# Patient Record
Sex: FEMALE | Race: WHITE | Employment: UNEMPLOYED | URBAN - METROPOLITAN AREA
[De-identification: names, ages, dates, MRNs, and addresses within clinical notes are randomized per-mention and may not be internally consistent; named-entity substitution may affect disease eponyms.]

---

## 2022-08-31 ENCOUNTER — APPOINTMENT (OUTPATIENT)
Dept: GENERAL RADIOLOGY | Age: 57
End: 2022-08-31

## 2022-08-31 ENCOUNTER — HOSPITAL ENCOUNTER (EMERGENCY)
Age: 57
Discharge: HOME | End: 2022-08-31
Attending: EMERGENCY MEDICINE

## 2022-08-31 VITALS
DIASTOLIC BLOOD PRESSURE: 73 MMHG | HEART RATE: 84 BPM | SYSTOLIC BLOOD PRESSURE: 128 MMHG | TEMPERATURE: 99 F | RESPIRATION RATE: 18 BRPM | OXYGEN SATURATION: 97 %

## 2022-08-31 DIAGNOSIS — S93.491A SPRAIN OF ANTERIOR TALOFIBULAR LIGAMENT OF RIGHT ANKLE, INITIAL ENCOUNTER: Primary | ICD-10-CM

## 2022-08-31 PROCEDURE — 99283 EMERGENCY DEPT VISIT LOW MDM: CPT

## 2022-08-31 PROCEDURE — 73630 X-RAY EXAM OF FOOT: CPT

## 2022-08-31 PROCEDURE — 73610 X-RAY EXAM OF ANKLE: CPT

## 2022-08-31 ASSESSMENT — ENCOUNTER SYMPTOMS
VOMITING: 0
NAUSEA: 0
ABDOMINAL PAIN: 0
BACK PAIN: 0
EYE PAIN: 0
SHORTNESS OF BREATH: 0

## 2022-08-31 ASSESSMENT — PAIN DESCRIPTION - LOCATION
LOCATION_2: FOOT
LOCATION: ANKLE;FOOT

## 2022-08-31 ASSESSMENT — PAIN DESCRIPTION - INTENSITY: RATING_2: 5

## 2022-08-31 ASSESSMENT — PAIN DESCRIPTION - PAIN TYPE: TYPE: ACUTE PAIN

## 2022-08-31 ASSESSMENT — PAIN DESCRIPTION - DESCRIPTORS
DESCRIPTORS_2: ACHING
DESCRIPTORS: ACHING

## 2022-08-31 ASSESSMENT — PAIN - FUNCTIONAL ASSESSMENT: PAIN_FUNCTIONAL_ASSESSMENT: 0-10

## 2022-08-31 ASSESSMENT — PAIN SCALES - GENERAL: PAINLEVEL_OUTOF10: 5

## 2022-08-31 ASSESSMENT — PAIN DESCRIPTION - ORIENTATION: ORIENTATION_2: LEFT

## 2022-08-31 NOTE — ED PROVIDER NOTES
[address] [facility] RESIDENT NOTE       Date of evaluation: 8/31/2022    Chief Complaint     Ankle Pain (Patient fell [place] a [place] and now has right ankle and foot and left foot pain. She had difficulty getting into her home due to pain while having to climb 3 steps so she came here to be evaluated.)      of Present Illness     Deyanira Tiwari is a 60 y.o. female who presents to the emergency department after an ankle injury. Patient [place] when her right foot fell into a hole, twisted causing her to fall onto her left foot and hands. Patient reports pain in her right ankle and left foot since then. Denies any pain in her hands. Denies any head strike, chest pain abdominal pain neck pain or back pain. No injury anywhere else. Has been able to ambulate but with pain. Review of Systems     Review of Systems   Constitutional:  Negative for fatigue and fever. HENT:  Negative for ear pain. Eyes:  Negative for pain. Respiratory:  Negative for shortness of breath. Gastrointestinal:  Negative for abdominal pain, nausea and vomiting. Genitourinary:  Negative for flank pain. Musculoskeletal:  Positive for arthralgias and joint swelling. Negative for back pain. Skin:  Negative for wound. Neurological:  Negative for weakness and numbness. Past Medical, Surgical, Family, and Social History     She has no past medical history on file. She has a past surgical history that includes Breast surgery and Carpal tunnel release (Left). Her family history is not on file. She reports that she has never smoked. She has never used smokeless tobacco. She reports that she does not drink alcohol. Medications     Previous Medications    No medications on file       Allergies     She is allergic to sulfa antibiotics.     Physical Exam     INITIAL VITALS: BP: 128/73, Temp: 99 °F (37.2 °C), Heart Rate: 84, Resp: 18, SpO2: 97 %   Physical Exam  Vitals and nursing note reviewed. Constitutional:       General: She is not in acute distress. Appearance: Normal appearance. She is not ill-appearing, toxic-appearing or diaphoretic. HENT:      Head: Normocephalic and atraumatic. Comments: No battles sign. No racoon eyes. No midface instability. No septal hematoma. No loose teeth. No head/face wounds. Right Ear: Ear canal and external ear normal.      Left Ear: Ear canal and external ear normal.      Nose: Nose normal.      Mouth/Throat:      Mouth: Mucous membranes are moist.      Pharynx: No oropharyngeal exudate or posterior oropharyngeal erythema. Eyes:      General: No scleral icterus. Right eye: No discharge. Left eye: No discharge. Conjunctiva/sclera: Conjunctivae normal.      Pupils: Pupils are equal, round, and reactive to light. Neck:      Comments: No c spine tenderness. Cardiovascular:      Rate and Rhythm: Normal rate and regular rhythm. Pulses: Normal pulses. Heart sounds: Normal heart sounds. Pulmonary:      Effort: Pulmonary effort is normal.   Abdominal:      General: There is no distension. Palpations: Abdomen is soft. There is no mass. Tenderness: There is no abdominal tenderness. There is no guarding or rebound. Hernia: No hernia is present. Comments: No abdominal bruising. Musculoskeletal:         General: Swelling and tenderness present. No deformity or signs of injury. Normal range of motion. Cervical back: Normal range of motion and neck supple. No rigidity. No muscular tenderness. Right lower leg: No edema. Left lower leg: No edema. Comments: No t or l spine tenderness. Mild swelling over right ankle with mild tenderness to ant/lateral portion. Able to range. No tenderness over the foot. No tendernss to remainder of LLE. Mild tenderness to left medial foot wo skin changes.  No tenderness elsewhere in the foot or ankle. No tenderness in remainder of extremities   Lymphadenopathy:      Cervical: No cervical adenopathy. Skin:     General: Skin is warm and dry. Capillary Refill: Capillary refill takes less than 2 seconds. Coloration: Skin is not jaundiced. Findings: No bruising, erythema or rash. Neurological:      Mental Status: She is alert and oriented to person, place, and time. Mental status is at baseline. Sensory: No sensory deficit. Motor: No weakness. Comments: GCS 15.   Psychiatric:         Mood and Affect: Mood normal.       RECENT VITALS:  BP: 128/73, Temp: 99 °F (37.2 °C), Heart Rate: 84,Resp: 18, SpO2: 97 %     DiagnosticResults     RADIOLOGY:  XR FOOT LEFT (MIN 3 VIEWS)   Final Result   Impression: No acute osseous abnormality. XR FOOT RIGHT (MIN 3 VIEWS)   Final Result   Impression: No acute osseous abnormality. XR ANKLE RIGHT (MIN 3 VIEWS)   Final Result   Impression: No acute osseous abnormality. LABS:   No results found for this visit on 08/31/22. ED Course     Nursing Notes, Past Medical Hx, Past Surgical Hx, Social Hx, Allergies, and Family Hx were reviewed. The patient was given the followingmedications:  No orders of the defined types were placed in this encounter. CONSULTS:  None    MEDICAL DECISION MAKING / ASSESSMENT / PLAN     Wilfrido Ritchie is a 60 y.o. female presents to the emergency department for evaluation of ankle injury. Patient had what sounds like a inversion injury in her right ankle with mild swelling mild tenderness on exam but negative x-rays. Will place Ace wrap and discussed rice therapy with her. Mild tenderness on left foot without concerning findings for Lisfranc injury, and also negative x-ray. No tenderness elsewhere in her right lower extremity, and patient with mild mechanism without other areas of pain. This patient was also evaluated by the attending physician.  All care plans werediscussed and agreed upon.    Clinical Impression     1. Sprain of anterior talofibular ligament of right ankle, initial encounter        Disposition     PATIENT REFERRED TO:  No follow-up provider specified.     DISCHARGE MEDICATIONS:  New Prescriptions    No medications on file       DISPOSITION Decision To Discharge 08/31/2022 07:40:14 PM       Candice Borden MD  Resident  08/31/22 [id number]

## 2022-08-31 NOTE — ED PROVIDER NOTES
ED Attending Attestation Note     Date of evaluation: 8/31/2022    This patient was seen by the resident. I have seen and examined the patient, agree with the workup, evaluation, management and diagnosis. The care plan has been discussed. My assessment reveals an overall well-appearing patient, pleasantly conversational, in no acute distress. She presents with pain in the left foot and right ankle after stepping into a hole in the ground, and falling. On examination, there is no obvious edema or deformity of either lower extremity. The patient has some mild tenderness to palpation over the instep of the left foot, and some mild diffuse tenderness to palpation about the right ankle. Plain films are negative for fracture bilaterally.        Ronald Smith MD  08/31/22 [id number]

## 2022-08-31 NOTE — DISCHARGE INSTRUCTIONS
Apply a compressive ACE bandage. Rest and elevate the affected painful area. Apply cold compresses intermittently as needed. As pain recedes, begin normal activities slowly as tolerated.   Call primary care doctor if symptoms persist.